# Patient Record
(demographics unavailable — no encounter records)

---

## 2020-01-01 NOTE — NUR
Infant noted to be slightly jittery. Blood sugar obtained and noted to be
49. CRM and pulse ox removed and infant out to room per Dr. Xiao. RN explained
to parents that a blood sugar was obtained due to infant appearing jittery and
advised to feed as it was below 50. Mother will put infant to breast. Bottle
also provided. RN expained blood glucose will be rechecked approx 1 hour after
feeding. Understanding verbalized.

## 2020-01-01 NOTE — NUR
Mother into nursery to breastfeed infant. Updated on POC, understanding
verbalized. Circ permit signed.

## 2020-01-01 NOTE — NUR
1154 MALE CHILD DELIVERED VIA  BY DR SHERON PÉREZ PLACED ON MOTHER'S CHEST
WHERE HE WAS DRIED AND STIMULATED. APGARS 8,9,9. VIT K AND ERYTHROMYCIN
ADMINISTERED PER PROTOCOL.  ID BANDS PLACED X2, ID BANDS PLACED ON MOTHER AND
FATHER.